# Patient Record
Sex: FEMALE | Race: WHITE | NOT HISPANIC OR LATINO | URBAN - METROPOLITAN AREA
[De-identification: names, ages, dates, MRNs, and addresses within clinical notes are randomized per-mention and may not be internally consistent; named-entity substitution may affect disease eponyms.]

---

## 2019-03-23 ENCOUNTER — WALK IN (OUTPATIENT)
Dept: URGENT CARE | Age: 54
End: 2019-03-23

## 2019-03-23 VITALS
WEIGHT: 150 LBS | BODY MASS INDEX: 25.61 KG/M2 | OXYGEN SATURATION: 98 % | RESPIRATION RATE: 16 BRPM | HEART RATE: 96 BPM | HEIGHT: 64 IN | DIASTOLIC BLOOD PRESSURE: 81 MMHG | TEMPERATURE: 98.7 F | SYSTOLIC BLOOD PRESSURE: 100 MMHG

## 2019-03-23 DIAGNOSIS — R05.9 COUGH: ICD-10-CM

## 2019-03-23 DIAGNOSIS — R50.81 FEVER IN OTHER DISEASES: Primary | ICD-10-CM

## 2019-03-23 DIAGNOSIS — J10.1 INFLUENZA A: ICD-10-CM

## 2019-03-23 LAB
FLUAV AG UPPER RESP QL IA.RAPID: POSITIVE
FLUBV AG UPPER RESP QL IA.RAPID: NEGATIVE

## 2019-03-23 PROCEDURE — 87804 INFLUENZA ASSAY W/OPTIC: CPT | Performed by: NURSE PRACTITIONER

## 2019-03-23 PROCEDURE — 99203 OFFICE O/P NEW LOW 30 MIN: CPT | Performed by: NURSE PRACTITIONER

## 2019-03-23 RX ORDER — OSELTAMIVIR PHOSPHATE 75 MG/1
75 CAPSULE ORAL 2 TIMES DAILY
Qty: 10 CAPSULE | Refills: 0 | Status: SHIPPED | OUTPATIENT
Start: 2019-03-23 | End: 2019-03-28

## 2019-03-23 RX ORDER — CETIRIZINE HYDROCHLORIDE 10 MG/1
10 TABLET ORAL DAILY
COMMUNITY

## 2019-03-23 RX ORDER — BENZONATATE 100 MG/1
100 CAPSULE ORAL 3 TIMES DAILY PRN
Qty: 15 CAPSULE | Refills: 0 | Status: SHIPPED | OUTPATIENT
Start: 2019-03-23 | End: 2019-03-28

## 2019-03-23 RX ORDER — FLUTICASONE PROPIONATE 50 MCG
SPRAY, SUSPENSION (ML) NASAL DAILY
COMMUNITY

## 2019-03-23 ASSESSMENT — ENCOUNTER SYMPTOMS
SORE THROAT: 0
COUGH: 1
VOMITING: 0
ABDOMINAL PAIN: 0
NAUSEA: 1
WEAKNESS: 0
CHILLS: 1
FATIGUE: 1
SWOLLEN GLANDS: 0
FEVER: 1
HEADACHES: 1

## 2019-08-07 ENCOUNTER — OFFICE VISIT (OUTPATIENT)
Dept: INTERNAL MEDICINE | Facility: CLINIC | Age: 54
End: 2019-08-07

## 2019-08-07 ENCOUNTER — TELEPHONE (OUTPATIENT)
Dept: INTERNAL MEDICINE | Facility: CLINIC | Age: 54
End: 2019-08-07

## 2019-08-07 VITALS
SYSTOLIC BLOOD PRESSURE: 122 MMHG | WEIGHT: 154.2 LBS | BODY MASS INDEX: 26.32 KG/M2 | HEIGHT: 64 IN | DIASTOLIC BLOOD PRESSURE: 70 MMHG

## 2019-08-07 DIAGNOSIS — N60.19 FIBROCYSTIC BREAST DISEASE (FCBD), UNSPECIFIED LATERALITY: ICD-10-CM

## 2019-08-07 DIAGNOSIS — Z00.00 HEALTHCARE MAINTENANCE: Primary | ICD-10-CM

## 2019-08-07 PROCEDURE — 99203 OFFICE O/P NEW LOW 30 MIN: CPT | Performed by: PHYSICIAN ASSISTANT

## 2019-08-07 RX ORDER — FLUTICASONE PROPIONATE 50 MCG
SPRAY, SUSPENSION (ML) NASAL
COMMUNITY
Start: 2019-05-27

## 2019-08-07 RX ORDER — CHLORHEXIDINE GLUCONATE 0.12 MG/ML
RINSE ORAL
COMMUNITY
Start: 2017-08-09

## 2019-08-07 NOTE — PROGRESS NOTES
Subjective   Chief Complaint   Patient presents with   • Edema     Under left arm       Pt is 54 yr old white female hx of fibrocystic breast, rare PACs who presents today as a new pt. No hx of htn, hld. Saw her previous PCP Dr. Tejeda for labs and cpe in last year. Cholesterol numbers always great. She is utd on cscope today to repeat in 10 yrs at age 58. She has had swelling under her left arm off and on for last few months. Not painful, comes and goes. No recent infections. Her last mammogram was in 2017. She does see gyn at [facility] annual in September. Because of her dense cystic breast, she does regular exams and no changes in breast tissue that she has noticed. Her mammogram is scheduled for next cpl of weeks. She has had to have US fup several times in past due to cysts with aspiration as well. No fhx of breast cancer. 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          Patient Active Problem List   Diagnosis   • Fibrocystic breast disease (FCBD)       No Known Allergies    Current Outpatient Medications on File Prior to Visit   Medication Sig Dispense Refill   • cetirizine (ZyrTEC) 10 MG tablet Take 10 mg by mouth Daily.     • chlorhexidine (PERIDEX) 0.12 % solution      • fluticasone (FLONASE) 50 MCG/ACT nasal spray        No current facility-administered medications on file prior to visit.        Past Medical History:   Diagnosis Date   • History of EKG    • Palpitations    • Sinus arrhythmia        Family History   Problem Relation Age of Onset   • Valvular heart disease Mother    • Heart disease Father    • Atrial fibrillation Father    • Stroke Father    • Stroke Paternal Grandfather        Social History     Socioeconomic History   • Marital status:      Spouse name: Not on file   • Number of children: 2   • Years of education: Not on file   • Highest education level: Not on file   Occupational History   • Occupation: Teacher   Tobacco Use   • Smoking status: Never  "Smoker   • Smokeless tobacco: Never Used   Substance and Sexual Activity   • Alcohol use: No   • Drug use: No       Past Surgical History:   Procedure Laterality Date   •  SECTION      x2   • DILATATION AND CURETTAGE     • HYSTERECTOMY     • SALPINGECTOMY     • TONSILLECTOMY AND ADENOIDECTOMY         The following portions of the patient's history were reviewed and updated as appropriate: problem list, allergies, current medications, past medical history, past family history, past social history and past surgical history.    Review of Systems   Constitution: Negative for night sweats and weight loss.   Cardiovascular: Negative for chest pain and dyspnea on exertion.   Skin:        Swelling under left axilla   Gastrointestinal: Negative for abdominal pain and change in bowel habit.         There is no immunization history on file for this patient.    Objective   Vitals:    19 1005 19 1028   BP:  122/70   Weight: 69.9 kg (154 lb 3.2 oz)    Height: 162.6 cm (64\")      Physical Exam   Constitutional: She appears well-developed and well-nourished.   HENT:   Head: Normocephalic and atraumatic.   Right Ear: Tympanic membrane and external ear normal.   Left Ear: Tympanic membrane and external ear normal.   Nose: Nose normal.   Mouth/Throat: Oropharynx is clear and moist.   Eyes: Conjunctivae and EOM are normal. Pupils are equal, round, and reactive to light.   Neck: Neck supple. Carotid bruit is not present. No thyromegaly present.   Cardiovascular: Normal rate, regular rhythm and normal heart sounds.   No murmur heard.  Pulmonary/Chest: Effort normal and breath sounds normal. Right breast exhibits no inverted nipple, no nipple discharge, no skin change and no tenderness. Left breast exhibits no inverted nipple, no nipple discharge, no skin change and no tenderness. Breasts are symmetrical. There is no breast swelling.   Pt has a 1.5 cm cyst in RUO quadrant that is mobile and non tender. She also has a " few smaller scattered cysts throughout right breast. She does have a 0.5 -1 cm cyst 6 o clock position of left breast, also mobile. No hard or fixed masses or lesions.    Abdominal: Soft. Bowel sounds are normal.   Lymphadenopathy:        Head (right side): No submental, no submandibular, no tonsillar, no preauricular, no posterior auricular and no occipital adenopathy present.        Head (left side): No submental, no submandibular, no tonsillar, no preauricular, no posterior auricular and no occipital adenopathy present.     She has no cervical adenopathy.        Right cervical: No superficial cervical, no deep cervical and no posterior cervical adenopathy present.       Left cervical: No superficial cervical, no deep cervical and no posterior cervical adenopathy present.     She has no axillary adenopathy.        Right axillary: No pectoral and no lateral adenopathy present.        Left axillary: No pectoral and no lateral adenopathy present.  Vitals reviewed.        Assessment/Plan   Gena was seen today for edema.    Diagnoses and all orders for this visit:    Healthcare maintenance  -     Lipid Panel With / Chol / HDL Ratio; Future  -     Comprehensive Metabolic Panel; Future  -     Vitamin D 25 Hydroxy; Future  -     Urinalysis With Culture If Indicated -; Future  -     TSH; Future    Fibrocystic breast disease (FCBD), unspecified laterality    She does have very dense breast tissue on exam, I do not see or palpate any evidence of edema today. She does have 2 rather large cysts that are palpable, one in each breasts. I have reviewed her mammogram and US from 2017 seems she had a cyst in area of palpable cyst of the left breast then. She sees GYN at [place], I do think these are both likely going to be cysts however I have recommended mammogram for further evaluation. She does not want me to order since she gets them at [place], she is going to call her GYN and have it scheduled in the next 1-2 weeks prior to  her annual exam.     She is going to fup with me in November for her CPE, I will get labs prior to her visit.     Colonoscopy at 48 at [facility] Dr. Colby she believes, will work on obtaining cscope record. She is going to sign a records release today to get chart from old PCP and review labs and immunizations. Recommended Shingrix vaccine at her pharmacy. Keep annual with GYN in a few weeks.     Return in about 3 months (around 11/7/2019) for Annual physical.

## 2019-08-07 NOTE — TELEPHONE ENCOUNTER
Called will be faxing over.                  ----- Message from Cristina Desai PA-C sent at 8/7/2019 11:56 AM EDT -----  Will you please call [facility] and get pt's last colonoscopy, she believes it was Dr. Colby she saw. Thanks!

## 2019-11-07 ENCOUNTER — RESULTS ENCOUNTER (OUTPATIENT)
Dept: INTERNAL MEDICINE | Facility: CLINIC | Age: 54
End: 2019-11-07

## 2019-11-07 DIAGNOSIS — Z00.00 HEALTHCARE MAINTENANCE: ICD-10-CM

## 2019-12-16 ENCOUNTER — OFFICE VISIT (OUTPATIENT)
Dept: INTERNAL MEDICINE | Facility: CLINIC | Age: 54
End: 2019-12-16

## 2019-12-16 VITALS
HEIGHT: 64 IN | WEIGHT: 155 LBS | TEMPERATURE: 98.5 F | SYSTOLIC BLOOD PRESSURE: 120 MMHG | BODY MASS INDEX: 26.46 KG/M2 | DIASTOLIC BLOOD PRESSURE: 80 MMHG

## 2019-12-16 DIAGNOSIS — R35.0 URINARY FREQUENCY: Primary | ICD-10-CM

## 2019-12-16 LAB
BILIRUB BLD-MCNC: NEGATIVE MG/DL
CLARITY, POC: CLEAR
COLOR UR: YELLOW
GLUCOSE UR STRIP-MCNC: NEGATIVE MG/DL
KETONES UR QL: NEGATIVE
LEUKOCYTE EST, POC: NEGATIVE
NITRITE UR-MCNC: NEGATIVE MG/ML
PH UR: 6 [PH] (ref 5–8)
PROT UR STRIP-MCNC: NEGATIVE MG/DL
RBC # UR STRIP: ABNORMAL /UL
SP GR UR: 1.01 (ref 1–1.03)
UROBILINOGEN UR QL: NORMAL

## 2019-12-16 PROCEDURE — 81003 URINALYSIS AUTO W/O SCOPE: CPT | Performed by: PHYSICIAN ASSISTANT

## 2019-12-16 PROCEDURE — 99213 OFFICE O/P EST LOW 20 MIN: CPT | Performed by: PHYSICIAN ASSISTANT

## 2019-12-16 NOTE — PROGRESS NOTES
Subjective   Chief Complaint   Patient presents with   • Urinary Frequency     started yesterday/ frequent urination and after still feel like have to go       History of Present Illness   Pt here today with urinary frequency x 1 day also notes malodorous urine. Has been pushing water and fluids, no back pain or dysuria.      Patient Active Problem List   Diagnosis   • Fibrocystic breast disease (FCBD)       No Known Allergies    Current Outpatient Medications on File Prior to Visit   Medication Sig Dispense Refill   • cetirizine (ZyrTEC) 10 MG tablet Take 10 mg by mouth Daily.     • chlorhexidine (PERIDEX) 0.12 % solution      • fluticasone (FLONASE) 50 MCG/ACT nasal spray        No current facility-administered medications on file prior to visit.        Past Medical History:   Diagnosis Date   • History of EKG    • Palpitations    • Sinus arrhythmia        Family History   Problem Relation Age of Onset   • Valvular heart disease Mother    • Heart disease Father    • Atrial fibrillation Father    • Stroke Father    • Stroke Paternal Grandfather        Social History     Socioeconomic History   • Marital status:      Spouse name: Not on file   • Number of children: 2   • Years of education: Not on file   • Highest education level: Not on file   Occupational History   • Occupation: Teacher   Tobacco Use   • Smoking status: Never Smoker   • Smokeless tobacco: Never Used   Substance and Sexual Activity   • Alcohol use: No   • Drug use: No       Past Surgical History:   Procedure Laterality Date   •  SECTION      x2   • DILATATION AND CURETTAGE     • HYSTERECTOMY     • SALPINGECTOMY     • TONSILLECTOMY AND ADENOIDECTOMY         PHQ-9 Depression Screening  Little interest or pleasure in doing things?     Feeling down, depressed, or hopeless?     Trouble falling or staying asleep, or sleeping too much?     Feeling tired or having little energy?     Poor appetite or overeating?     Feeling bad about yourself  "- or that you are a failure or have let yourself or your family down?     Trouble concentrating on things, such as reading the newspaper or watching television?     Moving or speaking so slowly that other people could have noticed? Or the opposite - being so fidgety or restless that you have been moving around a lot more than usual?     Thoughts that you would be better off dead, or of hurting yourself in some way?     PHQ-9 Total Score     If you checked off any problems, how difficult have these problems made it for you to do your work, take care of things at home, or get along with other people?       The following portions of the patient's history were reviewed and updated as appropriate: problem list, allergies, current medications, past medical history, past family history, past social history and past surgical history.    Review of Systems   Constitution: Negative for chills and fever.   Musculoskeletal: Negative for back pain.   Genitourinary: Positive for frequency.         There is no immunization history on file for this patient.    Objective   Vitals:    12/16/19 1457 12/16/19 1512   BP:  120/80   Temp: 98.5 °F (36.9 °C)    TempSrc: Oral    Weight: 70.3 kg (155 lb)    Height: 162.6 cm (64.02\")      Body mass index is 26.59 kg/m².  Physical Exam   Constitutional: She appears well-developed and well-nourished.   Abdominal: Soft. Bowel sounds are normal. There is no tenderness.   Vitals reviewed.        Assessment/Plan   Gena was seen today for urinary frequency.    Diagnoses and all orders for this visit:    Urinary frequency  -     Urinalysis With Culture If Indicated -  -     POCT urinalysis dipstick, automated    send out urine, drink plenty of water and start AZO for frequency etc.          "

## 2019-12-19 LAB
APPEARANCE UR: CLEAR
BACTERIA #/AREA URNS HPF: NORMAL /HPF
BACTERIA UR CULT: ABNORMAL
BACTERIA UR CULT: ABNORMAL
BILIRUB UR QL STRIP: NEGATIVE
COLOR UR: YELLOW
EPI CELLS #/AREA URNS HPF: NORMAL /HPF (ref 0–10)
GLUCOSE UR QL: NEGATIVE
HGB UR QL STRIP: NEGATIVE
KETONES UR QL STRIP: NEGATIVE
LEUKOCYTE ESTERASE UR QL STRIP: ABNORMAL
MICRO URNS: ABNORMAL
NITRITE UR QL STRIP: NEGATIVE
OTHER ANTIBIOTIC SUSC ISLT: ABNORMAL
PH UR STRIP: 6.5 [PH] (ref 5–7.5)
PROT UR QL STRIP: NEGATIVE
RBC #/AREA URNS HPF: NORMAL /HPF (ref 0–2)
SP GR UR: 1.01 (ref 1–1.03)
URINALYSIS REFLEX: ABNORMAL
UROBILINOGEN UR STRIP-MCNC: 0.2 MG/DL (ref 0.2–1)
WBC #/AREA URNS HPF: NORMAL /HPF (ref 0–5)

## 2019-12-20 ENCOUNTER — TELEPHONE (OUTPATIENT)
Dept: INTERNAL MEDICINE | Facility: CLINIC | Age: 54
End: 2019-12-20

## 2019-12-20 DIAGNOSIS — N39.0 URINARY TRACT INFECTION WITHOUT HEMATURIA, SITE UNSPECIFIED: Primary | ICD-10-CM

## 2019-12-20 RX ORDER — CIPROFLOXACIN 500 MG/1
500 TABLET, FILM COATED ORAL 2 TIMES DAILY
Qty: 6 TABLET | Refills: 0 | Status: SHIPPED | OUTPATIENT
Start: 2019-12-20 | End: 2022-01-11

## 2019-12-20 NOTE — TELEPHONE ENCOUNTER
----- Message from Cristina Desai PA-C sent at 12/20/2019  8:08 AM EST -----  She does have a UTI, please send in Cipro 500 mg BID x 3 days

## 2019-12-20 NOTE — TELEPHONE ENCOUNTER
Spoke with patient and told her the results also told her that medication was sent in, she said ok

## 2019-12-20 NOTE — TELEPHONE ENCOUNTER
Left patient a message to call back did send in medication to pharmacy, ask patient to call back to let her know results and what Cristina wants her to do next

## 2022-01-11 ENCOUNTER — OFFICE VISIT (OUTPATIENT)
Dept: FAMILY MEDICINE CLINIC | Facility: CLINIC | Age: 57
End: 2022-01-11

## 2022-01-11 VITALS
DIASTOLIC BLOOD PRESSURE: 80 MMHG | WEIGHT: 159 LBS | HEART RATE: 84 BPM | OXYGEN SATURATION: 100 % | RESPIRATION RATE: 16 BRPM | HEIGHT: 65 IN | SYSTOLIC BLOOD PRESSURE: 146 MMHG | BODY MASS INDEX: 26.49 KG/M2

## 2022-01-11 DIAGNOSIS — Z00.00 ROUTINE HEALTH MAINTENANCE: Primary | ICD-10-CM

## 2022-01-11 DIAGNOSIS — Z13.220 ENCOUNTER FOR LIPID SCREENING FOR CARDIOVASCULAR DISEASE: ICD-10-CM

## 2022-01-11 DIAGNOSIS — Z11.59 ENCOUNTER FOR HEPATITIS C SCREENING TEST FOR LOW RISK PATIENT: ICD-10-CM

## 2022-01-11 DIAGNOSIS — Z23 NEED FOR VACCINATION: ICD-10-CM

## 2022-01-11 DIAGNOSIS — Z13.1 SCREENING FOR DIABETES MELLITUS: ICD-10-CM

## 2022-01-11 DIAGNOSIS — Z13.6 ENCOUNTER FOR LIPID SCREENING FOR CARDIOVASCULAR DISEASE: ICD-10-CM

## 2022-01-11 PROCEDURE — 90715 TDAP VACCINE 7 YRS/> IM: CPT | Performed by: FAMILY MEDICINE

## 2022-01-11 PROCEDURE — 90471 IMMUNIZATION ADMIN: CPT | Performed by: FAMILY MEDICINE

## 2022-01-11 PROCEDURE — 99396 PREV VISIT EST AGE 40-64: CPT | Performed by: FAMILY MEDICINE

## 2022-01-11 NOTE — PROGRESS NOTES
"Chief Complaint  Establish Care    Subjective    History of Present Illness {CC  Problem List  Visit  Diagnosis   Encounters  Notes  Medications  Labs  Result Review Imaging  Media :23}     Gena Johns presents to [facility] for Establish Care.  History of Present Illness     Here today to establish care and for general annual exam. Doing well overall, no real questions or concerns at this time. Considers herself quite healthy overall. Takes no medicines regularly. Has regular follow-up with OB/GYN, is up-to-date on mammogram and Pap.    Would like to get some routine blood work done today. Also due for a tetanus booster. Is up-to-date on COVID vaccinations, has received her flu shot.    Works on diet and exercise regularly. Weight is stable overall. Has good family and social support. Enjoys her work. Gets regular dental checks.    Objective     Vital Signs:   /80   Pulse 84   Resp 16   Ht 163.8 cm (64.5\")   Wt 72.1 kg (159 lb)   SpO2 100%   BMI 26.87 kg/m²   Physical Exam  Vitals and nursing note reviewed.   Constitutional:       General: She is not in acute distress.     Appearance: Normal appearance. She is not ill-appearing.   Cardiovascular:      Rate and Rhythm: Normal rate and regular rhythm.      Pulses: Normal pulses.      Heart sounds: Normal heart sounds. No murmur heard.      Pulmonary:      Effort: Pulmonary effort is normal. No respiratory distress.      Breath sounds: Normal breath sounds. No rales.   Neurological:      Mental Status: She is alert and oriented to person, place, and time. Mental status is at baseline.   Psychiatric:         Mood and Affect: Mood normal.         Behavior: Behavior normal.          Result Review  Data Reviewed:{ Labs  Result Review  Imaging  Med Tab  Media :23}                   Assessment and Plan {CC Problem List  Visit Diagnosis  ROS  Review (Popup)  Health Maintenance  Quality  BestPractice  " Medications  SmartSets  SnapShot Encounters  Media :23}   Diagnoses and all orders for this visit:    1. Routine health maintenance (Primary)    2. Screening for diabetes mellitus  -     Comprehensive Metabolic Panel    3. Encounter for lipid screening for cardiovascular disease  -     Lipid Panel    4. Encounter for hepatitis C screening test for low risk patient  -     Hepatitis C Antibody    5. Need for vaccination  -     Tdap Vaccine Greater Than or Equal To 6yo IM    Orders as above. I will contact her with results as available. Tdap as requested.    Encouraged to continue with healthy lifestyle choices.    Recommended follow-up as below. Encouraged communication reminder in the meantime.      Follow Up {Instructions Charge Capture  Follow-up Communications :23}     Patient was given instructions and counseling regarding her condition or for health maintenance advice. Please see specific information pulled into the AVS (placed there by myself) if appropriate.    Return in about 1 year (around 1/11/2023) for Preventive Health Maintenance.      RAY Mckoy MD    Prevention counseling performed as below: Mindfulness for stress management.

## 2022-01-12 LAB
ALBUMIN SERPL-MCNC: 4.9 G/DL (ref 3.8–4.9)
ALBUMIN/GLOB SERPL: 1.9 {RATIO} (ref 1.2–2.2)
ALP SERPL-CCNC: 73 IU/L (ref 44–121)
ALT SERPL-CCNC: 15 IU/L (ref 0–32)
AST SERPL-CCNC: 23 IU/L (ref 0–40)
BILIRUB SERPL-MCNC: 0.5 MG/DL (ref 0–1.2)
BUN SERPL-MCNC: 14 MG/DL (ref 6–24)
BUN/CREAT SERPL: 16 (ref 9–23)
CALCIUM SERPL-MCNC: 10 MG/DL (ref 8.7–10.2)
CHLORIDE SERPL-SCNC: 102 MMOL/L (ref 96–106)
CHOLEST SERPL-MCNC: 200 MG/DL (ref 100–199)
CO2 SERPL-SCNC: 26 MMOL/L (ref 20–29)
CREAT SERPL-MCNC: 0.9 MG/DL (ref 0.57–1)
GLOBULIN SER CALC-MCNC: 2.6 G/DL (ref 1.5–4.5)
GLUCOSE SERPL-MCNC: 82 MG/DL (ref 65–99)
HCV AB S/CO SERPL IA: <0.1 S/CO RATIO (ref 0–0.9)
HDLC SERPL-MCNC: 93 MG/DL
LDLC SERPL CALC-MCNC: 94 MG/DL (ref 0–99)
POTASSIUM SERPL-SCNC: 4.1 MMOL/L (ref 3.5–5.2)
PROT SERPL-MCNC: 7.5 G/DL (ref 6–8.5)
SODIUM SERPL-SCNC: 141 MMOL/L (ref 134–144)
TRIGL SERPL-MCNC: 75 MG/DL (ref 0–149)
VLDLC SERPL CALC-MCNC: 13 MG/DL (ref 5–40)

## 2022-10-05 ENCOUNTER — OFFICE VISIT (OUTPATIENT)
Dept: FAMILY MEDICINE CLINIC | Facility: CLINIC | Age: 57
End: 2022-10-05

## 2022-10-05 VITALS
WEIGHT: 166 LBS | OXYGEN SATURATION: 99 % | BODY MASS INDEX: 28.05 KG/M2 | HEART RATE: 94 BPM | SYSTOLIC BLOOD PRESSURE: 122 MMHG | DIASTOLIC BLOOD PRESSURE: 82 MMHG

## 2022-10-05 DIAGNOSIS — J02.9 PHARYNGITIS, UNSPECIFIED ETIOLOGY: Primary | ICD-10-CM

## 2022-10-05 DIAGNOSIS — R68.89 CONGESTION OF THROAT: ICD-10-CM

## 2022-10-05 LAB
EXPIRATION DATE: NORMAL
INTERNAL CONTROL: NORMAL
Lab: NORMAL
S PYO AG THROAT QL: NEGATIVE

## 2022-10-05 PROCEDURE — 99213 OFFICE O/P EST LOW 20 MIN: CPT | Performed by: NURSE PRACTITIONER

## 2022-10-05 PROCEDURE — 87880 STREP A ASSAY W/OPTIC: CPT | Performed by: NURSE PRACTITIONER

## 2022-10-05 NOTE — PROGRESS NOTES
Subjective   Gena Johns is a 57 y.o. female.     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     Started feeling like she was getting ill this weekend, ear pain, throat pain, dry cough. Took zinc, vitamin C this weekend. Home covid test this am was negative. she is here for strep test.  No fever, no chills or body aches.  Patient works at  and is exposed to sick kids constantly.  She has been using Zyrtec and Flonase.    The following portions of the patient's history were reviewed and updated as appropriate: allergies, current medications, past family history, past medical history, past social history, past surgical history and problem list.    Review of Systems    Objective   Physical Exam  Constitutional:       Appearance: She is diaphoretic.   HENT:      Right Ear: Tympanic membrane normal.      Left Ear: Tympanic membrane normal.      Nose: Congestion present.      Mouth/Throat:      Mouth: Mucous membranes are moist.      Pharynx: Posterior oropharyngeal erythema present. No oropharyngeal exudate.   Eyes:      Pupils: Pupils are equal, round, and reactive to light.   Cardiovascular:      Rate and Rhythm: Normal rate and regular rhythm.      Pulses: Normal pulses.      Heart sounds: Normal heart sounds.   Pulmonary:      Effort: Pulmonary effort is normal.      Breath sounds: Normal breath sounds. No wheezing.   Abdominal:      General: Bowel sounds are normal.   Musculoskeletal:      Cervical back: Normal range of motion.   Lymphadenopathy:      Cervical: No cervical adenopathy.   Skin:     General: Skin is warm.   Neurological:      General: No focal deficit present.      Mental Status: She is alert.         Vitals:    10/05/22 1322   BP: 122/82   Pulse: 94   SpO2: 99%     Body mass index is 28.05 kg/m².    Procedures    Assessment & Plan   Problems Addressed this Visit    None     Visit Diagnoses     Sore throat    -  Primary    Relevant Orders    POCT rapid  strep A (Completed)      Diagnoses       Codes Comments    Sore throat    -  Primary ICD-10-CM: J02.9  ICD-9-CM: 462       Acute pharyngitis-likely viral, rapid strep is negative, home COVID test was negative.  Encourage patient to treat symptomatically with Tylenol, ibuprofen, rest, hot beverages hot tea with honey, throat lozenges edges/gargle salt water.  Call if fever or worsening symptoms           Education provided in AVS   No follow-ups on file.

## 2024-02-05 ENCOUNTER — OFFICE VISIT (OUTPATIENT)
Dept: FAMILY MEDICINE CLINIC | Facility: CLINIC | Age: 59
End: 2024-02-05
Payer: COMMERCIAL

## 2024-02-05 VITALS
HEIGHT: 65 IN | RESPIRATION RATE: 18 BRPM | HEART RATE: 84 BPM | DIASTOLIC BLOOD PRESSURE: 90 MMHG | SYSTOLIC BLOOD PRESSURE: 130 MMHG | BODY MASS INDEX: 26.66 KG/M2 | WEIGHT: 160 LBS | OXYGEN SATURATION: 97 %

## 2024-02-05 DIAGNOSIS — J01.00 ACUTE NON-RECURRENT MAXILLARY SINUSITIS: ICD-10-CM

## 2024-02-05 DIAGNOSIS — R51.9 ACUTE INTRACTABLE HEADACHE, UNSPECIFIED HEADACHE TYPE: Primary | ICD-10-CM

## 2024-02-05 PROCEDURE — 99214 OFFICE O/P EST MOD 30 MIN: CPT | Performed by: NURSE PRACTITIONER

## 2024-02-05 RX ORDER — AMOXICILLIN 875 MG/1
875 TABLET, COATED ORAL 2 TIMES DAILY
Qty: 14 TABLET | Refills: 0 | Status: SHIPPED | OUTPATIENT
Start: 2024-02-05 | End: 2024-02-12

## 2024-02-05 NOTE — PROGRESS NOTES
Subjective   Gena Johns is a 58 y.o. female.     History of Present Illness   Patient presents with c/o headache, acute, ongoing for three weeks. She reports that she was diagnosed with covid three weeks ago. She states that she still has lingering nasal congestion and headache up the back of her head. She reports that she is taking sudafed daily for symptoms and this has helped.     The following portions of the patient's history were reviewed and updated as appropriate: allergies, current medications, past family history, past medical history, past social history, past surgical history and problem list.    Review of Systems   Constitutional:  Negative for appetite change, chills, fatigue and fever.   HENT:  Positive for congestion and sinus pressure. Negative for ear pain, facial swelling, postnasal drip, rhinorrhea, sneezing and sore throat.    Eyes:  Negative for redness and itching.   Respiratory:  Positive for cough. Negative for chest tightness, shortness of breath and wheezing.    Cardiovascular:  Negative for chest pain, palpitations and leg swelling.   Musculoskeletal:  Negative for myalgias.   Allergic/Immunologic: Negative.    Neurological:  Positive for headache. Negative for dizziness.       Objective   Physical Exam  Vitals and nursing note reviewed.   Constitutional:       Appearance: Normal appearance. She is well-developed and normal weight.   HENT:      Head: Normocephalic and atraumatic.      Right Ear: Tympanic membrane, ear canal and external ear normal. A middle ear effusion is present. Tympanic membrane is not erythematous or retracted.      Left Ear: Tympanic membrane, ear canal and external ear normal. A middle ear effusion is present. Tympanic membrane is not erythematous or retracted.      Nose: Nose normal.      Right Sinus: Maxillary sinus tenderness present. No frontal sinus tenderness.      Left Sinus: Maxillary sinus tenderness present. No frontal sinus tenderness.       Mouth/Throat:      Lips: Pink.      Mouth: Mucous membranes are moist.      Pharynx: Oropharynx is clear. No oropharyngeal exudate.   Eyes:      General:         Right eye: No discharge.         Left eye: No discharge.      Conjunctiva/sclera: Conjunctivae normal.      Pupils: Pupils are equal, round, and reactive to light.   Neck:      Thyroid: No thyromegaly.   Cardiovascular:      Rate and Rhythm: Normal rate and regular rhythm.      Heart sounds: Normal heart sounds. No murmur heard.  Pulmonary:      Effort: Pulmonary effort is normal. No tachypnea or respiratory distress.      Breath sounds: Normal breath sounds. No decreased breath sounds, wheezing or rales.   Musculoskeletal:      Cervical back: Normal range of motion and neck supple.   Lymphadenopathy:      Head:      Right side of head: No submental, submandibular, tonsillar, preauricular, posterior auricular or occipital adenopathy.      Left side of head: No submental, submandibular, tonsillar, preauricular, posterior auricular or occipital adenopathy.      Cervical: No cervical adenopathy.      Upper Body:      Right upper body: No supraclavicular adenopathy.      Left upper body: No supraclavicular adenopathy.   Skin:     General: Skin is warm and dry.   Neurological:      Mental Status: She is alert and oriented to person, place, and time.   Psychiatric:         Behavior: Behavior normal.         Thought Content: Thought content normal.         Judgment: Judgment normal.         Vitals:    02/05/24 1103   BP: 130/90   Pulse: 84   Resp: 18   SpO2: 97%     Body mass index is 27.04 kg/m².      Procedures    Assessment & Plan   Problems Addressed this Visit    None  Visit Diagnoses       Acute intractable headache, unspecified headache type    -  Primary    Acute non-recurrent maxillary sinusitis        Relevant Medications    amoxicillin (AMOXIL) 875 MG tablet          Diagnoses         Codes Comments    Acute intractable headache, unspecified headache  type    -  Primary ICD-10-CM: R51.9  ICD-9-CM: 784.0     Acute non-recurrent maxillary sinusitis     ICD-10-CM: J01.00  ICD-9-CM: 461.0             Amocillin 875 1 p.o. BID X 7 days  I am prescribing you an antibiotic, it is important that you take all of this medication even if you are feeling better          Return if symptoms worsen or fail to improve.

## 2024-03-05 ENCOUNTER — OFFICE VISIT (OUTPATIENT)
Dept: FAMILY MEDICINE CLINIC | Facility: CLINIC | Age: 59
End: 2024-03-05
Payer: COMMERCIAL

## 2024-03-05 VITALS
OXYGEN SATURATION: 98 % | DIASTOLIC BLOOD PRESSURE: 82 MMHG | WEIGHT: 159 LBS | SYSTOLIC BLOOD PRESSURE: 158 MMHG | BODY MASS INDEX: 26.49 KG/M2 | HEART RATE: 84 BPM | HEIGHT: 65 IN | RESPIRATION RATE: 18 BRPM

## 2024-03-05 DIAGNOSIS — R03.0 ELEVATED BP WITHOUT DIAGNOSIS OF HYPERTENSION: ICD-10-CM

## 2024-03-05 DIAGNOSIS — G89.29 POST-COVID CHRONIC HEADACHE: ICD-10-CM

## 2024-03-05 DIAGNOSIS — H69.93 EUSTACHIAN TUBE DYSFUNCTION, BILATERAL: Primary | ICD-10-CM

## 2024-03-05 DIAGNOSIS — R51.9 POST-COVID CHRONIC HEADACHE: ICD-10-CM

## 2024-03-05 DIAGNOSIS — U09.9 POST-COVID CHRONIC HEADACHE: ICD-10-CM

## 2024-03-05 PROCEDURE — 99213 OFFICE O/P EST LOW 20 MIN: CPT | Performed by: NURSE PRACTITIONER

## 2024-03-05 RX ORDER — AZELASTINE 1 MG/ML
2 SPRAY, METERED NASAL 2 TIMES DAILY
Qty: 30 ML | Refills: 3 | Status: SHIPPED | OUTPATIENT
Start: 2024-03-05

## 2024-03-05 NOTE — PROGRESS NOTES
Subjective   Gena Johns is a 58 y.o. female.     History of Present Illness     Estab pt here for whooshing in ears    Acute whooshing in ears x 10 days. Very ill with covid in January. Had sinusitis 2/5/24 treated with amoxicillin.  Has had since freq headaches. She is taking zyrtec at night, flonase intermittently.  Patient has had whooshing in ears that is worse at night X 9 days.  This is making her anxious and disrupting her sleep.  Dr. Mckoy had recommended trying Afrin nasal spray for 3 to 4 days which did not relieve symptoms.  Today she is worried that high blood pressure could be causing the symptoms.  Initial /82. After sitting /82.  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 No dizziness, no room spinning or vertigo.     The following portions of the patient's history were reviewed and updated as appropriate: allergies, current medications, past family history, past medical history, past social history, past surgical history and problem list.    Review of Systems      Current Outpatient Medications:     cetirizine (ZyrTEC) 10 MG tablet, Take 1 tablet by mouth Daily., Disp: , Rfl:     chlorhexidine (PERIDEX) 0.12 % solution, , Disp: , Rfl:     azelastine (ASTELIN) 0.1 % nasal spray, 2 sprays into the nostril(s) as directed by provider 2 (Two) Times a Day. Use in each nostril as directed, Disp: 30 mL, Rfl: 3    Objective   Physical Exam  Vitals reviewed.   Constitutional:       Appearance: Normal appearance.   HENT:      Right Ear: No decreased hearing noted. No tenderness. A middle ear effusion is present. Tympanic membrane is not perforated or erythematous.      Left Ear: No decreased hearing noted. No tenderness. A middle ear effusion is present. Tympanic membrane is not perforated or erythematous.      Nose: Nose normal. No congestion or rhinorrhea.      Mouth/Throat:      Mouth: Mucous membranes are moist.   Eyes:      Conjunctiva/sclera: Conjunctivae normal.       "Pupils: Pupils are equal, round, and reactive to light.   Cardiovascular:      Rate and Rhythm: Normal rate and regular rhythm.      Pulses: Normal pulses.      Heart sounds: Normal heart sounds.   Pulmonary:      Effort: Pulmonary effort is normal.      Breath sounds: Normal breath sounds.   Musculoskeletal:      Cervical back: Normal range of motion.   Lymphadenopathy:      Cervical: No cervical adenopathy.   Skin:     General: Skin is warm.   Neurological:      General: No focal deficit present.      Mental Status: She is alert.      Motor: No weakness.         Vitals:    03/05/24 1548   BP: 158/82   Pulse: 84   Resp: 18   SpO2: 98%     Body mass index is 26.87 kg/m².    Procedures    No results found for: \"CMP\", \"BMP\", \"TSH\", \"CBC\", \"HGBA1C\", \"LIPIDINTERP\"                Assessment & Plan   Problems Addressed this Visit    None  Visit Diagnoses       Eustachian tube dysfunction, bilateral    -  Primary    Relevant Orders    Ambulatory Referral to ENT (Otolaryngology)    Elevated BP without diagnosis of hypertension        Post-COVID chronic headache              Diagnoses         Codes Comments    Eustachian tube dysfunction, bilateral    -  Primary ICD-10-CM: H69.93  ICD-9-CM: 381.81     Elevated BP without diagnosis of hypertension     ICD-10-CM: R03.0  ICD-9-CM: 796.2     Post-COVID chronic headache     ICD-10-CM: R51.9, U09.9, G89.29  ICD-9-CM: 784.0, 139.8, 338.29           Orders Placed This Encounter   Procedures    Ambulatory Referral to ENT (Otolaryngology)     Referral Priority:   Routine     Referral Type:   Consultation     Referral Reason:   Specialty Services Required     Requested Specialty:   Otolaryngology     Number of Visits Requested:   1     Eustachian tube dysfunction-refer ENT, Rx Astelin nasal spray 2 sprays twice daily, continue with over-the-counter allergy meds daily.  Symptoms are likely related to back-to-back COVID and sinusitis infections  Due to mildly elevated BP will hold off on " oral systemic steroid at this time    Elevated BP-encourage low stress activities such as walking, yoga, meditation, sleep hygiene.  Drink lots of water, limit high sodium foods.  Can check at home with BP cuff, goal less than 140/90    Headache-drink lots of water, avoid stress and headache triggers, whole food nutrition, low-sodium diet, may take over-the-counter Tylenol and ibuprofen as needed, can trial oral magnesium supplement         Education provided in AVS   No follow-ups on file.

## 2024-04-12 ENCOUNTER — TRANSCRIBE ORDERS (OUTPATIENT)
Dept: ADMINISTRATIVE | Facility: HOSPITAL | Age: 59
End: 2024-04-12
Payer: COMMERCIAL

## 2024-04-12 DIAGNOSIS — H93.A2 PULSATILE TINNITUS OF LEFT EAR: Primary | ICD-10-CM

## 2024-04-22 ENCOUNTER — HOSPITAL ENCOUNTER (OUTPATIENT)
Dept: MRI IMAGING | Facility: HOSPITAL | Age: 59
Discharge: HOME | End: 2024-04-22
Payer: COMMERCIAL

## 2024-04-22 DIAGNOSIS — H93.A2 PULSATILE TINNITUS OF LEFT EAR: ICD-10-CM

## 2024-04-22 PROCEDURE — 70553 MRI BRAIN STEM W/O & W/DYE: CPT

## 2024-04-22 PROCEDURE — A9577 INJ MULTIHANCE: HCPCS | Performed by: PHYSICIAN ASSISTANT

## 2024-04-22 PROCEDURE — 70549 MR ANGIOGRAPH NECK W/O&W/DYE: CPT

## 2024-04-22 PROCEDURE — 0 GADOBENATE DIMEGLUMINE 529 MG/ML SOLUTION: Performed by: PHYSICIAN ASSISTANT

## 2024-04-22 PROCEDURE — 70544 MR ANGIOGRAPHY HEAD W/O DYE: CPT

## 2024-04-22 RX ADMIN — GADOBENATE DIMEGLUMINE 15 ML: 529 INJECTION, SOLUTION INTRAVENOUS at 15:51

## 2024-04-29 LAB — CREAT BLDA-MCNC: 0.9 MG/DL (ref 0.6–1.3)

## 2024-04-30 ENCOUNTER — TRANSCRIBE ORDERS (OUTPATIENT)
Age: 59
End: 2024-04-30
Payer: COMMERCIAL

## 2024-04-30 DIAGNOSIS — I77.3 FIBROMUSCULAR DYSPLASIA: Primary | ICD-10-CM

## 2024-07-18 ENCOUNTER — OFFICE VISIT (OUTPATIENT)
Dept: FAMILY MEDICINE CLINIC | Facility: CLINIC | Age: 59
End: 2024-07-18
Payer: COMMERCIAL

## 2024-07-18 VITALS
BODY MASS INDEX: 25.78 KG/M2 | HEIGHT: 64 IN | DIASTOLIC BLOOD PRESSURE: 76 MMHG | HEART RATE: 90 BPM | SYSTOLIC BLOOD PRESSURE: 142 MMHG | WEIGHT: 151 LBS | RESPIRATION RATE: 18 BRPM | OXYGEN SATURATION: 98 %

## 2024-07-18 DIAGNOSIS — I10 PRIMARY HYPERTENSION: Primary | ICD-10-CM

## 2024-07-18 DIAGNOSIS — H92.01 RIGHT EAR PAIN: ICD-10-CM

## 2024-07-18 DIAGNOSIS — J01.00 ACUTE NON-RECURRENT MAXILLARY SINUSITIS: ICD-10-CM

## 2024-07-18 PROCEDURE — 99213 OFFICE O/P EST LOW 20 MIN: CPT | Performed by: NURSE PRACTITIONER

## 2024-07-18 RX ORDER — OLMESARTAN MEDOXOMIL 20 MG/1
20 TABLET ORAL DAILY
Qty: 30 TABLET | Refills: 3 | Status: SHIPPED | OUTPATIENT
Start: 2024-07-18

## 2024-07-18 NOTE — PROGRESS NOTES
"Subjective   Gena Johns is a 59 y.o. female.     History of Present Illness   Estab pt Dr Mckoy here for ear pain    Acute R ear pain. She was treated   The following portions of the patient's history were reviewed and updated as appropriate: allergies, current medications, past family history, past medical history, past social history, past surgical history and problem list.    Review of Systems      Current Outpatient Medications:     azelastine (ASTELIN) 0.1 % nasal spray, 2 sprays into the nostril(s) as directed by provider 2 (Two) Times a Day. Use in each nostril as directed, Disp: 30 mL, Rfl: 3    BABY ASPIRIN PO, Take  by mouth., Disp: , Rfl:     cetirizine (ZyrTEC) 10 MG tablet, Take 1 tablet by mouth Daily., Disp: , Rfl:     chlorhexidine (PERIDEX) 0.12 % solution, , Disp: , Rfl:     olmesartan (Benicar) 20 MG tablet, Take 1 tablet by mouth Daily., Disp: 30 tablet, Rfl: 3    Objective   Physical Exam    Vitals:    07/18/24 1501   BP: 142/76   Pulse: 90   Resp: 18   SpO2: 98%     Body mass index is 25.92 kg/m².    Procedures    No results found for: \"CMP\", \"BMP\", \"TSH\", \"CBC\", \"HGBA1C\", \"LIPIDINTERP\"                Assessment & Plan   Problems Addressed this Visit    None  Visit Diagnoses       Primary hypertension    -  Primary    Relevant Medications    olmesartan (Benicar) 20 MG tablet    Right ear pain              Diagnoses         Codes Comments    Primary hypertension    -  Primary ICD-10-CM: I10  ICD-9-CM: 401.9     Right ear pain     ICD-10-CM: H92.01  ICD-9-CM: 388.70                    Education provided in AVS   No follow-ups on file.  "

## 2024-07-18 NOTE — PROGRESS NOTES
Subjective   Gena Johns is a 59 y.o. female.     History of Present Illness   Estab pt Dr Mckoy, know to this provider. Here for ear followup     Acute earache, saw urgent care diagnosed Right otitis media 6/28/24 completed prednisone 50 mg x 5 days, cefdinir 600 qd x 7 days, astelin nasal spray. She is getting ready to fly on airplane and is not feeling better. No fever, no chills. No drainage.     Acute on chronic hypertension. Has been checking BP at home and in offices, BP has been elevated. /76. These are usually > 130s-140s/80s every check. No chest pain, palps, headaches. She would like to just start low dose medication for hypertension.     The following portions of the patient's history were reviewed and updated as appropriate: allergies, current medications, past family history, past medical history, past social history, past surgical history and problem list.    Review of Systems      Current Outpatient Medications:     azelastine (ASTELIN) 0.1 % nasal spray, 2 sprays into the nostril(s) as directed by provider 2 (Two) Times a Day. Use in each nostril as directed, Disp: 30 mL, Rfl: 3    BABY ASPIRIN PO, Take  by mouth., Disp: , Rfl:     cetirizine (ZyrTEC) 10 MG tablet, Take 1 tablet by mouth Daily., Disp: , Rfl:     chlorhexidine (PERIDEX) 0.12 % solution, , Disp: , Rfl:     olmesartan (Benicar) 20 MG tablet, Take 1 tablet by mouth Daily., Disp: 30 tablet, Rfl: 3    Objective   Physical Exam  Vitals reviewed.   Constitutional:       Appearance: Normal appearance.   HENT:      Right Ear: Tympanic membrane normal.      Left Ear: Tympanic membrane normal.      Nose: Nose normal. No congestion.      Mouth/Throat:      Mouth: Mucous membranes are moist.   Eyes:      Pupils: Pupils are equal, round, and reactive to light.   Cardiovascular:      Rate and Rhythm: Normal rate and regular rhythm.      Pulses: Normal pulses.      Heart sounds: Normal heart sounds.   Pulmonary:      Effort:  "Pulmonary effort is normal.      Breath sounds: Normal breath sounds.   Musculoskeletal:      Cervical back: Normal range of motion.   Lymphadenopathy:      Cervical: No cervical adenopathy.   Skin:     General: Skin is warm.   Neurological:      Mental Status: She is alert.         Vitals:    07/18/24 1501   BP: 142/76   Pulse: 90   Resp: 18   SpO2: 98%     Body mass index is 25.92 kg/m².    Procedures    No results found for: \"CMP\", \"BMP\", \"TSH\", \"CBC\", \"HGBA1C\", \"LIPIDINTERP\"                Assessment & Plan   Problems Addressed this Visit    None  Visit Diagnoses       Primary hypertension    -  Primary    Relevant Medications    olmesartan (Benicar) 20 MG tablet    Right ear pain              Diagnoses         Codes Comments    Primary hypertension    -  Primary ICD-10-CM: I10  ICD-9-CM: 401.9     Right ear pain     ICD-10-CM: H92.01  ICD-9-CM: 388.70           Ear pain- no active infection, use decongestant in airplace, chew gum    HTN- start olmesartan 20 mg qd. Low na diet,w alk daily, reviewed med side effect profile and check BP log and send via [employer]. Goal < 130/80         Education provided in AVS   No follow-ups on file.  "

## 2024-07-19 RX ORDER — AMOXICILLIN 875 MG/1
875 TABLET, COATED ORAL 2 TIMES DAILY
Qty: 14 TABLET | Refills: 0 | OUTPATIENT
Start: 2024-07-19

## 2024-11-25 RX ORDER — OLMESARTAN MEDOXOMIL 20 MG/1
20 TABLET ORAL DAILY
Qty: 90 TABLET | Refills: 3 | Status: SHIPPED | OUTPATIENT
Start: 2024-11-25

## 2025-03-12 ENCOUNTER — OFFICE VISIT (OUTPATIENT)
Dept: FAMILY MEDICINE CLINIC | Facility: CLINIC | Age: 60
End: 2025-03-12
Payer: COMMERCIAL

## 2025-03-12 VITALS
SYSTOLIC BLOOD PRESSURE: 118 MMHG | OXYGEN SATURATION: 96 % | RESPIRATION RATE: 14 BRPM | HEART RATE: 76 BPM | BODY MASS INDEX: 27.68 KG/M2 | HEIGHT: 64 IN | WEIGHT: 162.1 LBS | DIASTOLIC BLOOD PRESSURE: 72 MMHG

## 2025-03-12 DIAGNOSIS — Z23 NEED FOR VACCINATION: ICD-10-CM

## 2025-03-12 DIAGNOSIS — E66.3 OVERWEIGHT WITH BODY MASS INDEX (BMI) OF 27 TO 27.9 IN ADULT: ICD-10-CM

## 2025-03-12 DIAGNOSIS — Z00.00 ROUTINE HEALTH MAINTENANCE: Primary | ICD-10-CM

## 2025-03-12 DIAGNOSIS — I10 PRIMARY HYPERTENSION: ICD-10-CM

## 2025-03-12 DIAGNOSIS — Z13.1 SCREENING FOR DIABETES MELLITUS: ICD-10-CM

## 2025-03-12 DIAGNOSIS — Z13.220 ENCOUNTER FOR LIPID SCREENING FOR CARDIOVASCULAR DISEASE: ICD-10-CM

## 2025-03-12 DIAGNOSIS — Z13.6 ENCOUNTER FOR LIPID SCREENING FOR CARDIOVASCULAR DISEASE: ICD-10-CM

## 2025-03-12 DIAGNOSIS — Z78.9 HISTORY OF MEASLES, MUMPS, RUBELLA (MMR) VACCINATION UNKNOWN: ICD-10-CM

## 2025-03-12 PROBLEM — I77.3 FIBROMUSCULAR DYSPLASIA OF BOTH CAROTID ARTERIES: Status: ACTIVE | Noted: 2024-05-13

## 2025-03-12 LAB
ALBUMIN SERPL-MCNC: 4.5 G/DL (ref 3.5–5.2)
ALBUMIN/GLOB SERPL: 1.7 G/DL
ALP SERPL-CCNC: 78 U/L (ref 39–117)
ALT SERPL-CCNC: 17 U/L (ref 1–33)
AST SERPL-CCNC: 25 U/L (ref 1–32)
BILIRUB SERPL-MCNC: 0.5 MG/DL (ref 0–1.2)
BUN SERPL-MCNC: 12 MG/DL (ref 8–23)
BUN/CREAT SERPL: 13.2 (ref 7–25)
CALCIUM SERPL-MCNC: 9.7 MG/DL (ref 8.6–10.5)
CHLORIDE SERPL-SCNC: 103 MMOL/L (ref 98–107)
CHOLEST SERPL-MCNC: 168 MG/DL (ref 0–200)
CO2 SERPL-SCNC: 27.6 MMOL/L (ref 22–29)
CREAT SERPL-MCNC: 0.91 MG/DL (ref 0.57–1)
EGFRCR SERPLBLD CKD-EPI 2021: 71.9 ML/MIN/1.73
GLOBULIN SER CALC-MCNC: 2.7 GM/DL
GLUCOSE SERPL-MCNC: 81 MG/DL (ref 65–99)
HDLC SERPL-MCNC: 86 MG/DL (ref 40–60)
LDLC SERPL CALC-MCNC: 70 MG/DL (ref 0–100)
POTASSIUM SERPL-SCNC: 4.5 MMOL/L (ref 3.5–5.2)
PROT SERPL-MCNC: 7.2 G/DL (ref 6–8.5)
SODIUM SERPL-SCNC: 142 MMOL/L (ref 136–145)
TRIGL SERPL-MCNC: 58 MG/DL (ref 0–150)
VLDLC SERPL CALC-MCNC: 12 MG/DL (ref 5–40)

## 2025-03-12 NOTE — PROGRESS NOTES
"Chief Complaint  Annual Exam    Subjective    History of Present Illness  History of Present Illness  The patient presents for annual exam and discussion of preventive health recommendations.     She had been experiencing persistent tinnitus, which has significantly impacted her quality of life. The condition was so severe that it would disrupt her sleep. She sought medical attention from an ENT specialist and underwent an MRI. In 06/2024, she reported a resolution of the pulsing sensation associated with her tinnitus.    She initiated olmesartan therapy last year due to uncontrolled blood pressure, which remained in the 140s despite previous normal readings. She reports feeling better on the medication and tolerates it well. She has been monitoring her blood pressure at home and reports no fluctuations.    She has a scheduled colorectal cancer screening in 06/2025. She has completed all necessary gynecological examinations. She has not received the pneumonia vaccine and  Shingrix. She is wondering about her measles immunity status.    No major changes to diet or exercise. Weight is generally stable.    Has good family and social support. Work is going well and stress is reasonably well-managed.      Objective     Vital Signs:   /72   Pulse 76   Resp 14   Ht 162.6 cm (64\")   Wt 73.5 kg (162 lb 1.6 oz)   SpO2 96%   BMI 27.82 kg/m²   Physical Exam  Vitals and nursing note reviewed.   Constitutional:       General: She is not in acute distress.     Appearance: Normal appearance. She is not ill-appearing.   Cardiovascular:      Rate and Rhythm: Normal rate and regular rhythm.      Pulses: Normal pulses.      Heart sounds: Normal heart sounds. No murmur heard.  Pulmonary:      Effort: Pulmonary effort is normal. No respiratory distress.      Breath sounds: Normal breath sounds. No rales.   Neurological:      Mental Status: She is alert and oriented to person, place, and time. Mental status is at baseline. "   Psychiatric:         Mood and Affect: Mood normal.         Behavior: Behavior normal.            Assessment and Plan   Diagnoses and all orders for this visit:    1. Routine health maintenance (Primary)    2. Primary hypertension  -     Comprehensive Metabolic Panel    3. Screening for diabetes mellitus  -     Comprehensive Metabolic Panel    4. Encounter for lipid screening for cardiovascular disease  -     Lipid Panel    5. History of measles, mumps, rubella (MMR) vaccination unknown  -     Measles / Mumps / Rubella Immunity    6. Overweight with body mass index (BMI) of 27 to 27.9 in adult    7. Need for vaccination  -     Pneumococcal Conjugate Vaccine 20-Valent (PCV20)      Assessment & Plan  1. Health maintenance.  A comprehensive set of laboratory tests will be conducted today to assess her cholesterol levels, kidney function, liver function, and electrolyte balance. She is scheduled for a colorectal cancer screening in 06/2025. She will receive the PCV20 pneumonia vaccine today. Her immunity status will be evaluated, and if necessary, a booster dose will be administered.    2. Hypertension.  Her blood pressure readings were within the normal range during this visit. She is currently on olmesartan, which she started last year. She reports feeling better on the medication and is tolerating it well. She will continue her current dosage of olmesartan. If she needs a refill, she will send a message to request it.    BMI is >= 25 and <30. (Overweight) The following options were offered after discussion;: exercise counseling/recommendations and nutrition counseling/recommendations    Encouraged to continue working on healthy lifestyle habits. Recommended follow up as below. Encouraged communication via [employer] in the meantime.     Patient was given instructions and counseling regarding her condition or for health maintenance advice. Please see specific information pulled into the AVS (placed there by myself) if  appropriate.    Return in about 1 year (around 3/12/2026), or if symptoms worsen or fail to improve, for Preventive Health Maintenance.    Patient or patient representative verbalized consent for the use of Ambient Listening during the visit with  Brady Mckoy MD for chart documentation. 3/12/2025  09:53 EDT    RAY Mckoy MD    Prevention counseling performed as below: Mindfulness for stress management.

## 2025-03-12 NOTE — PATIENT INSTRUCTIONS
Mindfulness apps: Headspace, Smiling Mind, [place]    Mindfulness-Based Stress Reduction  Mindfulness-based stress reduction (MBSR) is a program that helps people learn to practice mindfulness. Mindfulness is the practice of intentionally paying attention to the present moment. It can be learned and practiced through techniques such as education, breathing exercises, meditation, and yoga. MBSR includes several mindfulness techniques in one program.  MBSR works best when you understand the treatment, are willing to try new things, and can commit to spending time practicing what you learn. MBSR training may include learning about:  How your emotions, thoughts, and reactions affect your body.  New ways to respond to things that cause negative thoughts to start (triggers).  How to notice your thoughts and let go of them.  Practicing awareness of everyday things that you normally do without thinking.  The techniques and goals of different types of meditation.  What are the benefits of MBSR?  MBSR can have many benefits, which include helping you to:  Develop self-awareness. This refers to knowing and understanding yourself.  Learn skills and attitudes that help you to participate in your own health care.  Learn new ways to care for yourself.  Be more accepting about how things are, and let things go.  Be less judgmental and approach things with an open mind.  Be patient with yourself and trust yourself more.  MBSR has also been shown to:  Reduce negative emotions, such as depression and anxiety.  Improve memory and focus.  Change how you sense and approach pain.  Boost your body's ability to fight infections.  Help you connect better with other people.  Improve your sense of well-being.  Follow these instructions at home:    Find a local in-person or online MBSR program.  Set aside some time regularly for mindfulness practice.  Find a mindfulness practice that works best for you. This may include one or more of the  following:  Meditation. Meditation involves focusing your mind on a certain thought or activity.  Breathing awareness exercises. These help you to stay present by focusing on your breath.  Body scan. For this practice, you lie down and pay attention to each part of your body from head to toe. You can identify tension and soreness and intentionally relax parts of your body.  Yoga. Yoga involves stretching and breathing, and it can improve your ability to move and be flexible. It can also provide an experience of testing your body's limits, which can help you release stress.  Mindful eating. This way of eating involves focusing on the taste, texture, color, and smell of each bite of food. Because this slows down eating and helps you feel full sooner, it can be an important part of a weight-loss plan.  Find a podcast or recording that provides guidance for breathing awareness, body scan, or meditation exercises. You can listen to these any time when you have a free moment to rest without distractions.  Follow your treatment plan as told by your health care provider. This may include taking regular medicines and making changes to your diet or lifestyle as recommended.  How to practice mindfulness  To do a basic awareness exercise:  Find a comfortable place to sit.  Pay attention to the present moment. Observe your thoughts, feelings, and surroundings just as they are.  Avoid placing judgment on yourself, your feelings, or your surroundings. Make note of any judgment that comes up, and let it go.  Your mind may wander, and that is okay. Make note of when your thoughts drift, and return your attention to the present moment.  To do basic mindfulness meditation:  Find a comfortable place to sit. This may include a stable chair or a firm floor cushion.  Sit upright with your back straight. Let your arms fall next to your side with your hands resting on your legs.  If sitting in a chair, rest your feet flat on the floor.  If  sitting on a cushion, cross your legs in front of you.  Keep your head in a neutral position with your chin dropped slightly. Relax your jaw and rest the tip of your tongue on the roof of your mouth. Drop your gaze to the floor. You can close your eyes if you like.  Breathe normally and pay attention to your breath. Feel the air moving in and out of your nose. Feel your belly expanding and relaxing with each breath.  Your mind may wander, and that is okay. Make note of when your thoughts drift, and return your attention to your breath.  Avoid placing judgment on yourself, your feelings, or your surroundings. Make note of any judgment or feelings that come up, let them go, and bring your attention back to your breath.  When you are ready, lift your gaze or open your eyes. Pay attention to how your body feels after the meditation.  Where to find more information  You can find more information about MBSR from:  Your health care provider.  Community-based meditation centers or programs.  Programs offered near you.  Summary  Mindfulness-based stress reduction (MBSR) is a program that teaches you how to intentionally pay attention to the present moment. It is used with other treatments to help you cope better with daily stress, emotions, and pain.  MBSR focuses on developing self-awareness, which allows you to respond to life stress without judgment or negative emotions.  MBSR programs may involve learning different mindfulness practices, such as breathing exercises, meditation, yoga, body scan, or mindful eating. Find a mindfulness practice that works best for you, and set aside time for it on a regular basis.  This information is not intended to replace advice given to you by your health care provider. Make sure you discuss any questions you have with your health care provider.  Document Released: 04/26/2018 Document Revised: 11/30/2018 Document Reviewed: 04/26/2018  [employer]vier Patient Education © 2020 [employer]

## 2025-03-13 LAB
MEV IGG SER IA-ACNC: 84 AU/ML
MUV IGG SER IA-ACNC: 200 AU/ML
RUBV IGG SERPL IA-ACNC: 20.4 INDEX

## 2025-06-02 ENCOUNTER — OFFICE (AMBULATORY)
Dept: URBAN - METROPOLITAN AREA PATHOLOGY 4 | Facility: PATHOLOGY | Age: 60
End: 2025-06-02
Payer: COMMERCIAL

## 2025-06-02 DIAGNOSIS — D12.0 BENIGN NEOPLASM OF CECUM: ICD-10-CM

## 2025-06-02 PROBLEM — Z12.11 SCREENING FOR MALIGNANT NEOPLASMS OF COLON: Status: ACTIVE | Noted: 2025-06-02

## 2025-06-02 PROBLEM — Z12.11 SCREENING FOR COLONIC NEOPLASIA: Status: ACTIVE | Noted: 2025-06-02

## 2025-06-02 PROBLEM — K63.5 POLYP OF COLON: Status: ACTIVE | Noted: 2025-06-02

## 2025-06-02 PROCEDURE — 88305 TISSUE EXAM BY PATHOLOGIST: CPT | Performed by: PATHOLOGY
